# Patient Record
(demographics unavailable — no encounter records)

---

## 2024-12-26 NOTE — DISCUSSION/SUMMARY
[EKG obtained to assist in diagnosis and management of assessed problem(s)] : EKG obtained to assist in diagnosis and management of assessed problem(s) [FreeTextEntry1] : Jaida Sloan is a 78 year old woman with aortic stenosis, hyperlipidemia, rheumatoid arthritis, hypertrophic cardiomyopathy s/p surgical septal myectomy and AVR (9/2013) c/b post-op complete heart block s/p Abbott 2ch PM but with pacing-mediated cardiomyopathy who underwent upgrade to Abbott CRT-D (2015) by Dr. Mcdonald with abandonment of RV lead s/p recent generator replacement 3/28/24 (Dr Gilmore), now presenting for follow up.  The is a likely fracture of her atrial pacing lead resulting in intermittent atrial oversensing and inappropriate mode switch. This has been generally asymptomatic, and there has not been any inhibition of ventricular pacing. The atrial lead otherwise senses and is performing normally, though there continues to be recorded AMS events c/w atrial noise. Device function is normal otherwise. She is tolerating current medications.  As long as there is no clinical sequalae from the atrial lead malfunction will continue conservative management. If the atrial lead continues to malfunction with loss of pacing support, then ultimately further lead revision may be needed.   Recommendations:  - Continue remote monitoring of [facility]. -Patient to call and report if new symptoms develop -Can consider in future possible lead revision if patient become symptomatic with atrial lead malfunction/AV desynchrony develops.  - EP follow up 6 months or prn-needed.

## 2024-12-26 NOTE — HISTORY OF PRESENT ILLNESS
Negative
[FreeTextEntry1] : Jaida Sloan is a 78 year old woman with aortic stenosis, hyperlipidemia, rheumatoid arthritis, hypertrophic cardiomyopathy s/p surgical septal myectomy and AVR (9/2013) c/b post-op complete heart block s/p Abbott 2ch PM but with pacing-mediated cardiomyopathy who underwent upgrade to Abbott CRT-D (2015) by Dr. Mcdonald with abandonment of RV lead s/p recent generator replacement 3/28/24 (Dr Gilmore), now presenting for follow up.   On last interrogation of her Abbott  CRT-D interrogation  atrial lead malfunction with intermittent artifact on the atrial channel resulting in brief AMS episodes. Multiple "AMS" episodes c/w brief atrial lead noise that was reproducible with arm movements and resulted in atrial oversensing. The RV and LV lead were functioning normally, and she is pacemaker dependent (BVP 99%. AP 76%). Atrial lead sensing was stable at 4mV, and impedance stable at 350 ohm, with pacing threshold 1V@0.4ms. Programming changes were made: Adjusted atrial sensitivity to 1.0 mV and discontinued auto capture feature.  With this the atrial lead noise was no longer causing oversensing.   Today she is doing well overall and denies palpitation, dizziness, SOB, syncope or near syncope. She reports on event where her heart was "fluttering" on 12/12/24 early in the morning. Device reveals no arrhythmia events at that time. She did not have any other associated symptoms at that time.   Device function reveals good functioning, Battery ~ 6.9 years to RRT.  AP 76%  99%.   AT/ AF burden <1%.  Multiple AMS episodes recorded, c/w atrial noise. No other arrhythmia episodes noted    ECG today reveals: Atrial and BI-V paced HR 60 BPM  Current medications include ASA 81mg, Methotrexate 2.5mg Q3 wks., Metoprolol 100mg QD, Nifedipine ER 30 mg QD, Omeprazole 20mg.   

## 2024-12-26 NOTE — REVIEW OF SYSTEMS
[Negative] : Heme/Lymph [Palpitations] : palpitations [Feeling Fatigued] : not feeling fatigued [SOB] : no shortness of breath [Dyspnea on exertion] : not dyspnea during exertion [Chest Discomfort] : no chest discomfort [Lower Ext Edema] : no extremity edema [Leg Claudication] : no intermittent leg claudication [Orthopnea] : no orthopnea [PND] : no PND [Syncope] : no syncope [Cough] : no cough [Dizziness] : no dizziness [Easy Bleeding] : no tendency for easy bleeding [FreeTextEntry5] : see HPI - brief episode of palpitation on 12/12/24.

## 2024-12-26 NOTE — CARDIOLOGY SUMMARY
[de-identified] : 12/26/24: A paced Bi-V paced 60 BPM 9/5/24 : A paced and BiV pacing 60  [de-identified] : 1/2021 LVEF 59% mild MR, minimal AI, RVSP 30

## 2024-12-26 NOTE — END OF VISIT
[FreeTextEntry4] :  I, Jorgito Baron, am scribing for and in the presence of  in the following sections HISTORY OF PRESENT ILLNESSS, PAST MEDICAL/FAMILY/SOCIAL HISTORY; REVIEW OF SYSTEMS; VITAL SIGNS; PHYSICAL EXAM; ASSESSMENT/PLAN [FreeTextEntry3] : I, Manuel Velasquez, personally performed the services described in this documentation. All medical record entries made by the scribe were at my direction and in my presence. I have reviewed the chart and agree that the record reflects my personal performance and is accurate and complete.            I was present for the above evaluation and agree with the history, physical examination, assessment and plan as above.

## 2024-12-26 NOTE — CARDIOLOGY SUMMARY
[de-identified] : 12/26/24: A paced Bi-V paced 60 BPM 9/5/24 : A paced and BiV pacing 60  [de-identified] : 1/2021 LVEF 59% mild MR, minimal AI, RVSP 30

## 2024-12-26 NOTE — REASON FOR VISIT
[Arrhythmia/ECG Abnorrmalities] : arrhythmia/ECG abnormalities [FreeTextEntry3] : Dr. Durant [FreeTextEntry1] : transferring from Dr Gilmore

## 2024-12-26 NOTE — CARDIOLOGY SUMMARY
[de-identified] : 12/26/24: A paced Bi-V paced 60 BPM 9/5/24 : A paced and BiV pacing 60  [de-identified] : 1/2021 LVEF 59% mild MR, minimal AI, RVSP 30

## 2025-06-03 NOTE — PHYSICAL EXAM
[Well Developed] : well developed [Well Nourished] : well nourished [No Acute Distress] : no acute distress [Normal Venous Pressure] : normal venous pressure [Normal S1, S2] : normal S1, S2 [No Murmur] : no murmur [No Rub] : no rub [No Gallop] : no gallop [Clear Lung Fields] : clear lung fields [Good Air Entry] : good air entry [No Respiratory Distress] : no respiratory distress  [Normal Bowel Sounds] : normal bowel sounds [Normal Gait] : normal gait [No Edema] : no edema [No Varicosities] : no varicosities [Moves all extremities] : moves all extremities [Alert and Oriented] : alert and oriented [Normal memory] : normal memory

## 2025-06-05 NOTE — CARDIOLOGY SUMMARY
[de-identified] : 12/26/24: A paced Bi-V paced 60 BPM 9/5/24 : A paced and BiV pacing 60  [de-identified] : 1/2021 LVEF 59% mild MR, minimal AI, RVSP 30

## 2025-06-05 NOTE — CARDIOLOGY SUMMARY
[de-identified] : 12/26/24: A paced Bi-V paced 60 BPM 9/5/24 : A paced and BiV pacing 60  [de-identified] : 1/2021 LVEF 59% mild MR, minimal AI, RVSP 30

## 2025-06-05 NOTE — REVIEW OF SYSTEMS
[Palpitations] : palpitations [Negative] : Heme/Lymph [Feeling Fatigued] : not feeling fatigued [SOB] : no shortness of breath [Dyspnea on exertion] : not dyspnea during exertion [Chest Discomfort] : no chest discomfort [Lower Ext Edema] : no extremity edema [Leg Claudication] : no intermittent leg claudication [Orthopnea] : no orthopnea [PND] : no PND [Syncope] : no syncope [Cough] : no cough [Dizziness] : no dizziness [Easy Bleeding] : no tendency for easy bleeding

## 2025-06-05 NOTE — DISCUSSION/SUMMARY
[EKG obtained to assist in diagnosis and management of assessed problem(s)] : EKG obtained to assist in diagnosis and management of assessed problem(s) [FreeTextEntry1] : Jaida Sloan is a 78 year old woman with aortic stenosis, hyperlipidemia, rheumatoid arthritis, hypertrophic cardiomyopathy s/p surgical septal myectomy and AVR (9/2013) c/b post-op complete heart block s/p Abbott 2ch PM but with pacing-mediated cardiomyopathy who underwent upgrade to Abbott CRT-D (2015) by Dr. Mcdonald with abandonment of RV lead s/p recent generator replacement 3/28/24 (Dr Gilmore), now presenting for follow up. There is ongoing atrial lead noise likely due to lead fracture, however, all electrical parameters remain stable and wnl including impedance. She still has episodes of inappropriate AMS but these have not resulted in sig symptoms related to AV dyssynchrony. Device functioning normally otherwise, no changes were made today. Will continue to monitor closely in the event that she may require lead revision should there be a dramatic change.   - remote monitoring in place  -Can consider in future possible lead revision if patient become symptomatic with atrial lead malfunction/AV desynchrony develops. - EP follow up 6 months or sooner if needed

## 2025-06-05 NOTE — CARDIOLOGY SUMMARY
[de-identified] : 12/26/24: A paced Bi-V paced 60 BPM 9/5/24 : A paced and BiV pacing 60  [de-identified] : 1/2021 LVEF 59% mild MR, minimal AI, RVSP 30

## 2025-06-05 NOTE — ADDENDUM
[FreeTextEntry1] : I was present for the above evaluation.  Pt feeling well.  There is atrial lead dysfunction with noise on the atrial channel elicited by left arm movement, resulting in inappropriate mode switching. otherwise lead function is normal, and no clinical sequelae from this. will continue monitoring, and consider lead revision if further atrial lead failure noted.

## 2025-06-05 NOTE — HISTORY OF PRESENT ILLNESS
[FreeTextEntry1] : 78 year old woman with aortic stenosis, hyperlipidemia, rheumatoid arthritis, hypertrophic cardiomyopathy s/p surgical septal myectomy and AVR (9/2013) c/b post-op complete heart block s/p Abbott 2ch PM but with pacing-mediated cardiomyopathy who underwent upgrade to Abbott CRT-D (2015) by Dr. Mcdonald with abandonment of RV lead s/p recent generator replacement 3/28/24 (Dr Gilmore), now presenting for follow up.  IN 12/24, interrogation of her Abbott CRT-D interrogation atrial lead malfunction with intermittent artifact on the atrial channel resulting in brief AMS episodes. Multiple "AMS" episodes c/w brief atrial lead noise that was reproducible with arm movements and resulted in atrial oversensing. The RV and LV lead were functioning normally, and she is pacemaker dependent. Atrial lead sensing was stable at 4mV, and impedance stable at 350 ohm, with pacing threshold 1V@0.4ms. Programming changes were made: Adjusted atrial sensitivity to 1.0 mV and discontinued auto capture feature. With this the atrial lead noise was no longer causing oversensing.  Pt has been doing well. Intermittent lightheadedness but no syncope. Pt offers no acute complaints today. Denies chest pain, sob, LOWRY, palpitations, syncope, dizziness.   Device interrogation today reveals stable function c/w prior. Ongoing atrial noise. Adequate sensing and pacing thresholds. Stable lead impedance. AP 56%, BiVP 99%. Noted AMS episodes c/w atrial lead noise. No true arrythmia events.  ECG today reveals: AS-BiV paced with QRSD 159ms  Current medications include ASA 81mg, Methotrexate 2.5mg Q3 wks., Metoprolol 100mg QD, Nifedipine ER 30 mg QD, Omeprazole 20mg.